# Patient Record
Sex: FEMALE | Race: WHITE
[De-identification: names, ages, dates, MRNs, and addresses within clinical notes are randomized per-mention and may not be internally consistent; named-entity substitution may affect disease eponyms.]

---

## 2018-08-31 ENCOUNTER — HOSPITAL ENCOUNTER (EMERGENCY)
Dept: HOSPITAL 53 - M ED | Age: 48
Discharge: HOME | End: 2018-08-31
Payer: COMMERCIAL

## 2018-08-31 DIAGNOSIS — R55: Primary | ICD-10-CM

## 2018-08-31 DIAGNOSIS — I45.10: ICD-10-CM

## 2018-08-31 LAB
AMORPHOUS SEDIMENT RFX: (no result)
AMPHETAMINES UR QL SCN: NEGATIVE
ANION GAP: 8 MEQ/L (ref 8–16)
BARBITURATES UR QL SCN: NEGATIVE
BASO #: 0.1 10^3/UL (ref 0–0.2)
BASO %: 1 % (ref 0–1)
BENZODIAZ UR QL SCN: NEGATIVE
BLOOD UREA NITROGEN: 16 MG/DL (ref 7–18)
BZE UR QL SCN: NEGATIVE
CALCIUM LEVEL: 8.5 MG/DL (ref 8.5–10.1)
CANNABINOIDS UR QL SCN: NEGATIVE
CARBON DIOXIDE LEVEL: 26 MEQ/L (ref 21–32)
CHLORIDE LEVEL: 107 MEQ/L (ref 98–107)
CK MB CFR.DF SERPL CALC: 1.88
CK SERPL-CCNC: 53 U/L (ref 26–192)
CK-MB VALUE MASS: < 1 NG/ML (ref ?–3.6)
CREATININE FOR GFR: 0.85 MG/DL (ref 0.55–1.3)
D-DIMER QUANT: < 270 NG/ML (ref ?–500)
EOS #: 0.3 10^3/UL (ref 0–0.5)
EOSINOPHIL NFR BLD AUTO: 4.1 % (ref 0–3)
ETHYL ALCOHOL (ETHANOL): < 0.003 % (ref 0–0.01)
GFR SERPL CREATININE-BSD FRML MDRD: > 60 ML/MIN/{1.73_M2} (ref 58–?)
GLUCOSE, FASTING: 111 MG/DL (ref 70–100)
HEMATOCRIT: 38.6 % (ref 36–47)
HEMOGLOBIN: 13.1 G/DL (ref 12–15.5)
IMMATURE GRANULOCYTE %: 0.5 % (ref 0–3)
INR: 0.95
LEUKOCYTE ESTERASE UR AUTO RFX: NEGATIVE
LYMPH #: 1.5 10^3/UL (ref 1.5–4.5)
LYMPH %: 24.8 % (ref 24–44)
MEAN CORPUSCULAR HEMOGLOBIN: 29.6 PG (ref 27–33)
MEAN CORPUSCULAR HGB CONC: 33.9 G/DL (ref 32–36.5)
MEAN CORPUSCULAR VOLUME: 87.3 FL (ref 80–96)
METHADONE URINE: NEGATIVE
MONO #: 0.3 10^3/UL (ref 0–0.8)
MONO %: 4.8 % (ref 0–5)
NEUTROPHILS #: 3.9 10^3/UL (ref 1.8–7.7)
NEUTROPHILS %: 64.8 % (ref 36–66)
NRBC BLD AUTO-RTO: 0 % (ref 0–0)
OPIATES UR QL SCN: NEGATIVE
PARTIAL THROMBOPLASTIN TIME: 23.1 SECONDS (ref 25.4–37.6)
PCP UR QL SCN: NEGATIVE
PLATELET COUNT, AUTOMATED: 231 10^3/UL (ref 150–450)
POTASSIUM SERUM: 3.8 MEQ/L (ref 3.5–5.1)
PROTHROMBIN TIME: 12.8 SECONDS (ref 12.1–14.4)
RED BLOOD COUNT: 4.42 10^6/UL (ref 4–5.4)
RED CELL DISTRIBUTION WIDTH: 12.2 % (ref 11.5–14.5)
SODIUM LEVEL: 141 MEQ/L (ref 136–145)
SPECIFIC GRAVITY UR AUTO RFX: 1.02 (ref 1–1.03)
SQUAM EPITHELIAL CELL UR AURFX: 4 /HPF (ref 0–6)
THYROID STIMULATING HORMONE: 1.89 UIU/ML (ref 0.36–3.74)
TROPONIN I: < 0.02 NG/ML (ref ?–0.1)
WHITE BLOOD COUNT: 6.1 10^3/UL (ref 4–10)

## 2018-08-31 PROCEDURE — 70551 MRI BRAIN STEM W/O DYE: CPT

## 2018-08-31 RX ADMIN — MECLIZINE HYDROCHLORIDE 1 MG: 25 TABLET ORAL at 09:57

## 2020-06-15 ENCOUNTER — HOSPITAL ENCOUNTER (EMERGENCY)
Dept: HOSPITAL 53 - M ED | Age: 50
Discharge: HOME | End: 2020-06-15
Payer: COMMERCIAL

## 2020-06-15 VITALS — BODY MASS INDEX: 32.89 KG/M2 | HEIGHT: 63 IN | WEIGHT: 185.63 LBS

## 2020-06-15 VITALS — DIASTOLIC BLOOD PRESSURE: 98 MMHG | SYSTOLIC BLOOD PRESSURE: 167 MMHG

## 2020-06-15 DIAGNOSIS — G43.909: ICD-10-CM

## 2020-06-15 DIAGNOSIS — I80.02: Primary | ICD-10-CM

## 2020-06-15 NOTE — REPVR
PROCEDURE INFORMATION: 

Exam: US Left Non-Vascular Joint or Other Extremity Structure, Limited 

Exam date and time: 6/15/2020 6:19 PM 

Age: 49 years old 

Clinical indication: Swelling; Lower leg; Left; Additional info: Anterior bump 

to left anterior shin 



TECHNIQUE: 

Imaging protocol: Left US Non-Vascular Joint or Other Extremity Structure. 

Limited exam 



COMPARISON: 

No relevant prior studies available. 



FINDINGS: 

Soft tissues: At the site of the palpable abnormality at the anterior aspect of 

the lower leg, there is a serpiginous hypoechoic structure in the subcutaneous 

fat which shows no internal color flow but, but appears to have adjacent 

contiguous vessels. This appears to represent a segmental at least partially 

thrombosed superficial varix. No definite fluid collection is identified. 



IMPRESSION: 

At the site of the palpable abnormality, there appears to be a tortuous 

superficial varix which is at least partially segmentally thrombosed. Suggest 

follow-up lower extremity venous ultrasound of the patient's symptoms persist 

or worsen.



Electronically signed by: Ivet Candelaria On 06/15/2020  19:02:47 PM

## 2023-09-09 ENCOUNTER — HOSPITAL ENCOUNTER (INPATIENT)
Dept: HOSPITAL 53 - M ED | Age: 53
LOS: 1 days | Discharge: HOME | DRG: 305 | End: 2023-09-10
Attending: INTERNAL MEDICINE | Admitting: FAMILY MEDICINE
Payer: COMMERCIAL

## 2023-09-09 VITALS — WEIGHT: 189.82 LBS | BODY MASS INDEX: 33.63 KG/M2 | HEIGHT: 63 IN

## 2023-09-09 DIAGNOSIS — K76.0: ICD-10-CM

## 2023-09-09 DIAGNOSIS — G43.909: ICD-10-CM

## 2023-09-09 DIAGNOSIS — Z79.899: ICD-10-CM

## 2023-09-09 DIAGNOSIS — R74.01: ICD-10-CM

## 2023-09-09 DIAGNOSIS — I16.0: Primary | ICD-10-CM

## 2023-09-09 DIAGNOSIS — E55.9: ICD-10-CM

## 2023-09-09 DIAGNOSIS — R07.89: ICD-10-CM

## 2023-09-09 DIAGNOSIS — I10: ICD-10-CM

## 2023-09-09 LAB
ALBUMIN SERPL BCG-MCNC: 4.1 G/DL (ref 3.2–5.2)
ALP SERPL-CCNC: 217 U/L (ref 46–116)
ALT SERPL W P-5'-P-CCNC: 740 U/L (ref 7–40)
AST SERPL-CCNC: 616 U/L (ref ?–34)
BASOPHILS # BLD AUTO: 0.1 10^3/UL (ref 0–0.2)
BASOPHILS NFR BLD AUTO: 1.3 % (ref 0–1)
BILIRUB CONJ SERPL-MCNC: 0.6 MG/DL (ref ?–0.4)
BILIRUB SERPL-MCNC: 1 MG/DL (ref 0.3–1.2)
BUN SERPL-MCNC: 17 MG/DL (ref 9–23)
CALCIUM SERPL-MCNC: 9.3 MG/DL (ref 8.5–10.1)
CHLORIDE SERPL-SCNC: 105 MMOL/L (ref 98–107)
CK MB CFR.DF SERPL CALC: 1.02
CK MB CFR.DF SERPL CALC: 1.19
CK MB SERPL-MCNC: < 1 NG/ML (ref ?–3.6)
CK MB SERPL-MCNC: < 1 NG/ML (ref ?–3.6)
CK SERPL-CCNC: 84 U/L (ref 34–145)
CK SERPL-CCNC: 98 U/L (ref 34–145)
CO2 SERPL-SCNC: 28 MMOL/L (ref 20–31)
CREAT SERPL-MCNC: 0.87 MG/DL (ref 0.55–1.3)
EOSINOPHIL # BLD AUTO: 0.2 10^3/UL (ref 0–0.5)
EOSINOPHIL NFR BLD AUTO: 2.8 % (ref 0–3)
GFR SERPL CREATININE-BSD FRML MDRD: > 60 ML/MIN/{1.73_M2} (ref 51–?)
GLUCOSE SERPL-MCNC: 108 MG/DL (ref 60–100)
HCT VFR BLD AUTO: 40.1 % (ref 36–47)
HGB BLD-MCNC: 13.4 G/DL (ref 12–15.5)
INR PPP: 0.97
LIPASE SERPL-CCNC: 50 U/L (ref 12–53)
LYMPHOCYTES # BLD AUTO: 2.5 10^3/UL (ref 1.5–5)
LYMPHOCYTES NFR BLD AUTO: 35.6 % (ref 24–44)
MCH RBC QN AUTO: 29 PG (ref 27–33)
MCHC RBC AUTO-ENTMCNC: 33.4 G/DL (ref 32–36.5)
MCV RBC AUTO: 86.8 FL (ref 80–96)
MONOCYTES # BLD AUTO: 0.7 10^3/UL (ref 0–0.8)
MONOCYTES NFR BLD AUTO: 9.5 % (ref 2–8)
NEUTROPHILS # BLD AUTO: 3.6 10^3/UL (ref 1.5–8.5)
NEUTROPHILS NFR BLD AUTO: 50.5 % (ref 36–66)
PLATELET # BLD AUTO: 246 10^3/UL (ref 150–450)
POTASSIUM SERPL-SCNC: 3.6 MMOL/L (ref 3.5–5.1)
PROT SERPL-MCNC: 7.1 G/DL (ref 5.7–8.2)
PROTHROMBIN TIME: 12.6 SECONDS (ref 12.5–14.5)
RBC # BLD AUTO: 4.62 10^6/UL (ref 4–5.4)
RSV RNA NPH QL NAA+PROBE: NEGATIVE
SODIUM SERPL-SCNC: 143 MMOL/L (ref 136–145)
WBC # BLD AUTO: 7.1 10^3/UL (ref 4–10)

## 2023-09-10 VITALS — OXYGEN SATURATION: 97 % | TEMPERATURE: 97 F | DIASTOLIC BLOOD PRESSURE: 91 MMHG | SYSTOLIC BLOOD PRESSURE: 167 MMHG

## 2023-09-10 VITALS — OXYGEN SATURATION: 99 % | DIASTOLIC BLOOD PRESSURE: 90 MMHG | TEMPERATURE: 97.4 F | SYSTOLIC BLOOD PRESSURE: 160 MMHG

## 2023-09-10 VITALS — OXYGEN SATURATION: 96 % | SYSTOLIC BLOOD PRESSURE: 140 MMHG | DIASTOLIC BLOOD PRESSURE: 78 MMHG | TEMPERATURE: 97 F

## 2023-09-10 VITALS — SYSTOLIC BLOOD PRESSURE: 141 MMHG | DIASTOLIC BLOOD PRESSURE: 83 MMHG

## 2023-09-10 VITALS — SYSTOLIC BLOOD PRESSURE: 143 MMHG | DIASTOLIC BLOOD PRESSURE: 84 MMHG

## 2023-09-10 LAB
25(OH)D3 SERPL-MCNC: 33.4 NG/ML (ref 20–100)
ALBUMIN SERPL BCG-MCNC: 4.1 G/DL (ref 3.2–5.2)
ALP SERPL-CCNC: 199 U/L (ref 46–116)
ALT SERPL W P-5'-P-CCNC: 821 U/L (ref 7–40)
APAP SERPL-MCNC: < 2 UG/ML (ref 10–20)
AST SERPL-CCNC: 447 U/L (ref ?–34)
BASOPHILS # BLD AUTO: 0.1 10^3/UL (ref 0–0.2)
BASOPHILS NFR BLD AUTO: 1.9 % (ref 0–1)
BILIRUB SERPL-MCNC: 0.9 MG/DL (ref 0.3–1.2)
BUN SERPL-MCNC: 13 MG/DL (ref 9–23)
CALCIUM SERPL-MCNC: 9.6 MG/DL (ref 8.5–10.1)
CHLORIDE SERPL-SCNC: 106 MMOL/L (ref 98–107)
CO2 SERPL-SCNC: 24 MMOL/L (ref 20–31)
CREAT SERPL-MCNC: 0.75 MG/DL (ref 0.55–1.3)
EOSINOPHIL # BLD AUTO: 0.3 10^3/UL (ref 0–0.5)
EOSINOPHIL NFR BLD AUTO: 5.5 % (ref 0–3)
FERRITIN SERPL-MCNC: 461.6 NG/ML (ref 7.3–270.7)
GFR SERPL CREATININE-BSD FRML MDRD: > 60 ML/MIN/{1.73_M2} (ref 51–?)
GLUCOSE SERPL-MCNC: 163 MG/DL (ref 60–100)
HBV CORE IGM SER QL: NEGATIVE
HCT VFR BLD AUTO: 42.8 % (ref 36–47)
HCV AB SER QL: 0.12 INDEX (ref ?–0.8)
HGB BLD-MCNC: 14.1 G/DL (ref 12–15.5)
LDH SERPL L TO P-CCNC: 436 U/L (ref 120–246)
LYMPHOCYTES # BLD AUTO: 1.6 10^3/UL (ref 1.5–5)
LYMPHOCYTES NFR BLD AUTO: 29.9 % (ref 24–44)
MAGNESIUM SERPL-MCNC: 1.6 MG/DL (ref 1.8–2.4)
MCH RBC QN AUTO: 29.3 PG (ref 27–33)
MCHC RBC AUTO-ENTMCNC: 32.9 G/DL (ref 32–36.5)
MCV RBC AUTO: 88.8 FL (ref 80–96)
MONOCYTES # BLD AUTO: 0.3 10^3/UL (ref 0–0.8)
MONOCYTES NFR BLD AUTO: 6.1 % (ref 2–8)
NEUTROPHILS # BLD AUTO: 3 10^3/UL (ref 1.5–8.5)
NEUTROPHILS NFR BLD AUTO: 56.2 % (ref 36–66)
PLATELET # BLD AUTO: 256 10^3/UL (ref 150–450)
POTASSIUM SERPL-SCNC: 3.6 MMOL/L (ref 3.5–5.1)
PROT SERPL-MCNC: 7 G/DL (ref 5.7–8.2)
RBC # BLD AUTO: 4.82 10^6/UL (ref 4–5.4)
SODIUM SERPL-SCNC: 141 MMOL/L (ref 136–145)
T4 FREE SERPL-MCNC: 1.11 NG/DL (ref 0.89–1.76)
TSH SERPL DL<=0.005 MIU/L-ACNC: 1.36 UIU/ML (ref 0.55–4.78)
WBC # BLD AUTO: 5.3 10^3/UL (ref 4–10)

## 2023-09-10 RX ADMIN — MAGNESIUM SULFATE IN DEXTROSE SCH MLS/HR: 10 INJECTION, SOLUTION INTRAVENOUS at 13:51

## 2023-09-10 RX ADMIN — MAGNESIUM SULFATE IN DEXTROSE SCH MLS/HR: 10 INJECTION, SOLUTION INTRAVENOUS at 12:22
